# Patient Record
Sex: MALE | HISPANIC OR LATINO | URBAN - NONMETROPOLITAN AREA
[De-identification: names, ages, dates, MRNs, and addresses within clinical notes are randomized per-mention and may not be internally consistent; named-entity substitution may affect disease eponyms.]

---

## 2022-09-08 ENCOUNTER — OFFICE VISIT (OUTPATIENT)
Dept: URGENT CARE | Facility: CLINIC | Age: 32
End: 2022-09-08
Payer: COMMERCIAL

## 2022-09-08 VITALS
RESPIRATION RATE: 16 BRPM | HEIGHT: 68 IN | BODY MASS INDEX: 29.69 KG/M2 | OXYGEN SATURATION: 99 % | DIASTOLIC BLOOD PRESSURE: 82 MMHG | SYSTOLIC BLOOD PRESSURE: 132 MMHG | TEMPERATURE: 97.8 F | WEIGHT: 195.9 LBS | HEART RATE: 84 BPM

## 2022-09-08 DIAGNOSIS — H61.23 BILATERAL IMPACTED CERUMEN: ICD-10-CM

## 2022-09-08 PROCEDURE — 99203 OFFICE O/P NEW LOW 30 MIN: CPT | Performed by: PHYSICIAN ASSISTANT

## 2022-09-08 RX ORDER — EPINEPHRINE 0.3 MG/.3ML
INJECTION SUBCUTANEOUS
COMMUNITY
Start: 2022-07-26

## 2022-09-08 RX ORDER — ALBUTEROL SULFATE 90 UG/1
2 AEROSOL, METERED RESPIRATORY (INHALATION)
COMMUNITY
Start: 2022-08-22 | End: 2022-09-21

## 2022-09-08 ASSESSMENT — ENCOUNTER SYMPTOMS
FEVER: 0
CHILLS: 0
COUGH: 0

## 2022-09-08 NOTE — PROGRESS NOTES
"  Subjective:   José Antonio Chavez is a 32 y.o. male who presents today with   Chief Complaint   Patient presents with    Otalgia     Pt states began 1x day, R ear, can not hear out of it       Otalgia   There is pain in the right ear. This is a new problem. The current episode started yesterday. The problem occurs constantly. The problem has been unchanged. Pertinent negatives include no coughing, ear discharge or hearing loss. Treatments tried: rubbing alcohol. The treatment provided no relief.     PMH:  has no past medical history on file.  MEDS:   Current Outpatient Medications:     albuterol 108 (90 Base) MCG/ACT Aero Soln inhalation aerosol, Inhale 2 Puffs., Disp: , Rfl:     Fluticasone Propionate, Inhal, (FLOVENT DISKUS) 50 MCG/BLIST AEROSOL POWDER, BREATH ACTIVATED, Inhale 1 Each., Disp: , Rfl:     EPINEPHrine (EPIPEN) 0.3 MG/0.3ML Solution Auto-injector solution for injection, INJECT CONTENTS OF 1 PEN INTRAMUSCULARLY FOR SEVERE ALLERGIC REACTION. SEEK EMERGENCY MEDICAL ATTENTION AFTER USE, Disp: , Rfl:   ALLERGIES:   Allergies   Allergen Reactions    Lisinopril Hives    Lobster Rash     SURGHX: History reviewed. No pertinent surgical history.  SOCHX:    FH: Reviewed with patient, not pertinent to this visit.     Review of Systems   Constitutional:  Negative for chills and fever.   HENT:  Positive for ear pain. Negative for ear discharge and hearing loss.    Respiratory:  Negative for cough.       Objective:   /82 (BP Location: Left arm, Patient Position: Sitting, BP Cuff Size: Adult)   Pulse 84   Temp 36.6 °C (97.8 °F) (Temporal)   Resp 16   Ht 1.727 m (5' 8\")   Wt 88.9 kg (195 lb 14.4 oz)   SpO2 99%   BMI 29.79 kg/m²   Physical Exam  Vitals and nursing note reviewed.   Constitutional:       General: He is not in acute distress.     Appearance: Normal appearance. He is well-developed. He is not ill-appearing or toxic-appearing.   HENT:      Head: Normocephalic and atraumatic.      Right Ear: " Hearing normal. There is impacted cerumen. Tympanic membrane is not perforated.      Left Ear: Hearing normal. There is impacted cerumen. Tympanic membrane is not perforated.   Cardiovascular:      Rate and Rhythm: Normal rate and regular rhythm.      Heart sounds: Normal heart sounds.   Pulmonary:      Effort: Pulmonary effort is normal.   Musculoskeletal:      Comments: Normal movement in all 4 extremities   Skin:     General: Skin is warm and dry.   Neurological:      Mental Status: He is alert.      Coordination: Coordination normal.   Psychiatric:         Mood and Affect: Mood normal.       Assessment/Plan:   Assessment    1. Bilateral impacted cerumen    Other orders  - albuterol 108 (90 Base) MCG/ACT Aero Soln inhalation aerosol; Inhale 2 Puffs.  - Fluticasone Propionate, Inhal, (FLOVENT DISKUS) 50 MCG/BLIST AEROSOL POWDER, BREATH ACTIVATED; Inhale 1 Each.  - EPINEPHrine (EPIPEN) 0.3 MG/0.3ML Solution Auto-injector solution for injection; INJECT CONTENTS OF 1 PEN INTRAMUSCULARLY FOR SEVERE ALLERGIC REACTION. SEEK EMERGENCY MEDICAL ATTENTION AFTER USE  Patient tolerated bilateral ear lavage today and was able to visualize the TM following ear lavage and patient states his symptoms have completely improved.  Upon visualization of eardrums bilaterally there are no signs of infection at this time.  Discussed use of Debrox in the future.     Differential diagnosis, natural history, supportive care, and indications for immediate follow-up discussed.   Patient given instructions and understanding of medications and treatment.    If not improving in 3-5 days, F/U with PCP or return to  if symptoms worsen.    Patient agreeable to plan.    Please note that this dictation was created using voice recognition software. I have made every reasonable attempt to correct obvious errors, but I expect that there are errors of grammar and possibly content that I did not discover before finalizing the note.    Viktor Gaines  MIN

## 2022-09-08 NOTE — LETTER
September 8, 2022         Patient: José Antonio Chavez   YOB: 1990   Date of Visit: 9/8/2022           To Whom it May Concern:    José Antonio Chavez was seen in my clinic on 9/8/2022. Please excuse absence today.    If you have any questions or concerns, please don't hesitate to call.        Sincerely,           Viktor Gaines P.A.-C.  Electronically Signed